# Patient Record
Sex: FEMALE
[De-identification: names, ages, dates, MRNs, and addresses within clinical notes are randomized per-mention and may not be internally consistent; named-entity substitution may affect disease eponyms.]

---

## 2022-12-15 ENCOUNTER — NURSE TRIAGE (OUTPATIENT)
Dept: OTHER | Facility: CLINIC | Age: 22
End: 2022-12-15

## 2022-12-15 NOTE — TELEPHONE ENCOUNTER
Location of patient: [place]    Subjective: Caller states she was woken up with a feeling of her heart racing and chest pressure. States she had excessive amounts of caffeine today via [employer]. Nausea present. Denies feeling skipped beats. Denies having this feeling before. Feels uncomfortable to breath and feeling winded. No sweating or dizziness. Patient was able to speak clearly and answered questions appropriately. Current Symptoms: Tachycardia and chest pain    Onset: 2 hours ago; sudden    Pain Severity: 3/10; aching, pressure; constant    Temperature: Doesn't think she has a fever but hasn't checked temp. What has been tried: Trying to sleep but cant    Recommended disposition: Call  Now    Care advice provided, patient verbalizes understanding; denies any other questions or concerns; instructed to call back for any new or worsening symptoms. This triage is a result of a call to [address] [place] [address]. Please do not respond to the triage nurse through this encounter. Any subsequent communication should be directly with the patient.       Reason for Disposition   Chest pain   [1] Chest pain lasts > 5 minutes AND [2] described as crushing, pressure-like, or heavy    Protocols used: Heart Rate and Heartbeat Questions-ADULT-AH, Chest Pain-ADULT-AH